# Patient Record
Sex: FEMALE | Employment: OTHER | URBAN - METROPOLITAN AREA
[De-identification: names, ages, dates, MRNs, and addresses within clinical notes are randomized per-mention and may not be internally consistent; named-entity substitution may affect disease eponyms.]

---

## 2020-09-03 ENCOUNTER — NEW PATIENT (OUTPATIENT)
Dept: URBAN - METROPOLITAN AREA CLINIC 11 | Facility: CLINIC | Age: 65
End: 2020-09-03

## 2020-09-03 ASSESSMENT — VISUAL ACUITY
OD_SC: 20/20-2
OS_SC: 20/400

## 2020-09-03 ASSESSMENT — TONOMETRY
OD_IOP_MMHG: 18
OS_IOP_MMHG: 17

## 2020-09-03 NOTE — PATIENT DISCUSSION
Over 50% of exam was spent in dialogue with patient regarding her condition.  Offered PPV,MP,Gas .  Discussed procedure, risks and benefits with patient.  She desires to proceed with surgery.

## 2021-01-05 ENCOUNTER — FOLLOW UP (OUTPATIENT)
Dept: URBAN - METROPOLITAN AREA CLINIC 11 | Facility: CLINIC | Age: 66
End: 2021-01-05

## 2021-01-05 ASSESSMENT — TONOMETRY
OD_IOP_MMHG: 18
OS_IOP_MMHG: 19

## 2021-01-05 ASSESSMENT — VISUAL ACUITY
OS_SC: 20/200
OD_SC: 20/25

## 2021-01-05 NOTE — PATIENT DISCUSSION
I am pleased with her post-operative appearance in the left eye. The hole is closed completely. She is cleared for cataract surgery in both eyes. I will see her on an as needed basis.

## 2022-07-05 RX ORDER — DIPHENHYDRAMINE HCL 25 MG
CAPSULE ORAL
COMMUNITY

## 2022-07-05 RX ORDER — IBANDRONATE SODIUM 150 MG/1
TABLET, FILM COATED ORAL
COMMUNITY

## 2022-07-05 RX ORDER — B-COMPLEX WITH VITAMIN C
TABLET ORAL
COMMUNITY

## 2022-10-18 ENCOUNTER — ESTABLISHED PATIENT (OUTPATIENT)
Dept: URBAN - METROPOLITAN AREA CLINIC 8 | Facility: CLINIC | Age: 67
End: 2022-10-18

## 2022-10-18 DIAGNOSIS — H35.342: ICD-10-CM

## 2022-10-18 DIAGNOSIS — H25.11: ICD-10-CM

## 2022-10-18 PROCEDURE — 92014 COMPRE OPH EXAM EST PT 1/>: CPT

## 2022-10-18 ASSESSMENT — TONOMETRY
OS_IOP_MMHG: 14
OD_IOP_MMHG: 16

## 2022-10-18 ASSESSMENT — VISUAL ACUITY
OD_SC: 20/25
OS_SC: 20/70

## 2023-10-18 ENCOUNTER — ESTABLISHED PATIENT (OUTPATIENT)
Dept: URBAN - METROPOLITAN AREA CLINIC 8 | Facility: CLINIC | Age: 68
End: 2023-10-18

## 2023-10-18 DIAGNOSIS — H35.342: ICD-10-CM

## 2023-10-18 DIAGNOSIS — H25.11: ICD-10-CM

## 2023-10-18 PROCEDURE — 92014 COMPRE OPH EXAM EST PT 1/>: CPT

## 2023-10-18 ASSESSMENT — TONOMETRY
OS_IOP_MMHG: 17
OD_IOP_MMHG: 19

## 2023-10-18 ASSESSMENT — VISUAL ACUITY
OD_SC: 20/20
OS_SC: 20/50
OU_SC: 20/20
OS_PH: 20/40